# Patient Record
Sex: MALE | Race: WHITE | NOT HISPANIC OR LATINO
[De-identification: names, ages, dates, MRNs, and addresses within clinical notes are randomized per-mention and may not be internally consistent; named-entity substitution may affect disease eponyms.]

---

## 2023-05-22 PROBLEM — Z00.00 ENCOUNTER FOR PREVENTIVE HEALTH EXAMINATION: Status: ACTIVE | Noted: 2023-05-22

## 2023-05-30 ENCOUNTER — NON-APPOINTMENT (OUTPATIENT)
Age: 63
End: 2023-05-30

## 2023-05-30 ENCOUNTER — APPOINTMENT (OUTPATIENT)
Dept: ORTHOPEDIC SURGERY | Facility: CLINIC | Age: 63
End: 2023-05-30
Payer: COMMERCIAL

## 2023-05-30 VITALS
SYSTOLIC BLOOD PRESSURE: 103 MMHG | HEIGHT: 61.5 IN | HEART RATE: 60 BPM | BODY MASS INDEX: 20.3 KG/M2 | DIASTOLIC BLOOD PRESSURE: 70 MMHG | WEIGHT: 108.9 LBS

## 2023-05-30 DIAGNOSIS — M16.11 UNILATERAL PRIMARY OSTEOARTHRITIS, RIGHT HIP: ICD-10-CM

## 2023-05-30 PROCEDURE — 99205 OFFICE O/P NEW HI 60 MIN: CPT

## 2023-05-30 PROCEDURE — 73502 X-RAY EXAM HIP UNI 2-3 VIEWS: CPT | Mod: RT

## 2023-05-30 NOTE — PHYSICAL EXAM
[de-identified] : General Appearance / Station: Well developed, well nourished, in no acute distress\par Orientation: Oriented to person, place, and time\par Gait & Station: Ambulates without assistive device\par Neurologic: Normal leg sensation\par Cardiovascular: Warm extremity\par Lymphatics: No lymphedema\par Generalized Ligament Laxity: Normal\par Stiffness: Normal.\par \par LUMBAR SPINE\par Nontender at lumbar spine\par Straight leg raise: Negative\par Motor: 5/5 motor L2-S1\par Sensation Intact. No paresthesias L2-S1\par \par SYMPTOMATIC RIGHT HIP\par Range of motion: PAINFUL internal and external rotation of the hip.\par Strength: Within Normal Limits. Negative Trendelenburg sign                                                                     \par FADIR: POSITIVE\par Stinchfield: POSITIVE, with groin pain\par Palpation: TENDER at greater trochanter, Nontender SI joint                  \par \par RIGHT KNEE\par Alignment: Varus\par Skin: Normal. \par Effusion: None\par Quadriceps: Normal\par Range of motion: Normal\par PF crepitus: 1+\par PF apprehension: None\par Patella / Patella Tendon: None\par Palpation: Nontender\par Meniscus signs: Negative\par \par ASYMPTOMATIC LEFT HIP\par Range of motion: Painless internal and external rotation of the hip.\par Skin: Normal\par Strength: Within normal limits\par TAVO: Negative\par FADIR: Negative\par Stinchfield: Negative\par Palpation: Nontender at greater trochanter, Nontender at SI joint\par  [de-identified] : Imaging: AP Pelvis and lateral views of the right hip show right hip severe osteoarthritis with loss of joint space, subchondral sclerosis, marginal osteophyte formations, and subchondral cyst. There are no signs of fracture. The soft tissues appear unremarkable\par

## 2023-05-30 NOTE — HISTORY OF PRESENT ILLNESS
[de-identified] : Thomas Woody  is an 62 year-old male presents today with a chief complaint of right hip pain. Patient describes onset over the last number of months, but it may be going on for years.  He originally was scheduled for a hip resurfacing versus total hip back in 2018 however he had no pain at that time.  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  He still rests handball leagues but is having that more more difficulty with that as well.  He previously had been able to use a stationary bike and walk to stay active however he is finding it even has pain with those activities now\par \par Patient points to the hip, specifically in the groin aspect as maximum point of tenderness. Pain is typically 2-4/10 in severity, dull and achy but sometimes sharp in quality with certain activities. Symptoms are exacerbated by activity, or even walking/standing. They are improved with rest and modifications of daily routines. Patient denies any radiation of symptoms beyond the surrounding area. Back and knee symptoms appear to be largely unrelated. \par \par To address the pathology, they have tried OTC medications/NSAIDs with some relief, even though short lasting.  Exercise therapy actually makes things worse but may have allowed greater than 50 % range of motion. Putting on shoes and other similar activities of daily living are difficult. Things have gotten bad enough that patient will need assistive devices like the banister for going up and down stairs. \par \par At this point the patient is quite frustrated by their condition. As duration of symptoms exceeds [], they are here for further evaluation and discussion of possible diagnoses and management. They deny any further trauma. No fever or chills, no signs of infection. Today, patient does not state any other associated signs or complains outside of those described. \par \par A complete review of symptoms as well as past medical/surgical history, medications, allergies, social and family history, and other details of HPI and exam were reviewed per first visit intake form and updated accordingly. Additional and more relevant details are noted in further detail today. \par \par

## 2023-05-30 NOTE — DISCUSSION/SUMMARY
[Surgical risks reviewed] : Surgical risks reviewed [de-identified] : Thomas Woody  is an 62 year-year-old male with bone on bone arthritis of their Right Hip. The patient and I reviewed their chief complaint, history of present illness, radiology findings, differential diagnosis and the pros, cons, alternatives, risks, and benefits of further conservative treatment versus operative intervention. Based upon the patients continued symptoms and failure to respond to conservative treatment and after a shared decision making process, the patient would like to proceed with a Right Total Hip Replacement. 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 I explained that this is a major surgery with significant procedure risk factors. The benefits of surgery were discussed with the patient including the potential for improving his/her current clinical condition through operative intervention. Alternatives to surgical intervention including continued conservative management were also discussed in detail.  \par \par The patient  clearly understand that there is no guarantee of improvement with either treatment option, both carry risks including worsening pain. They also understand the indications and limitations of surgery and the need for post operative rehabilitation / recovery. Healing times vary greatly among patients and this was discussed. All patient questions were answered satisfactorily. Risks, benefits, and alternatives to surgery have been discussed with the patient including but not limited to bleeding and need for blood transfusion, infection, intraoperative or postoperative fracture, hardware failure, neurovascular injury, thigh or knee numbness, chronic pain and scarring, chronic tendonitis or swelling, dislocation, leg-length discrepancy, the potential need for future surgery, DVT, PE, heart attack, stroke and death. The patient was told that we will take steps to minimize these risks by using sterile technique, antibiotics and DVT prophylaxis when appropriate and follow the patient postoperatively in the office setting to monitor progress but we cannot eliminate these risks completely. The possibility of recurrent pain, no improvement in pain and actual worsening of pain were also discussed with the patient.  \par \par The patient demonstrates understanding and wishes to proceed.After our long discussion the final decision for surgery was made today. The patient will sit down with the surgical coordinator to discuss clearances and complete necessary paperwork.\par \par During the shared decision making process, educational tools including implant model and patient x-rays were used to discuss implant type and function.  Ceramic on polyethylene implants from [employer]/ [employer], [employer]/ [employer], or [employer]  are the implants I am most comfortable utilizing in my primary total hip replacements and the implant I am planning for their total hip.  If the patient wishes to utilize a different implant brand or type for their total hip they may obtain an additional surgical opinion from a surgeon utilizing that brand of implant. The hospitalization and post-operative care and rehabilitation were also discussed. The use of perioperative antibiotics and DVT prophylaxis were discussed. The patient was also advised of risks related to the medical comorbidities and elevated body mass index (BMI).  The discharge plan of care focused on the patient going home following surgery and the importance to remove trip and fall hazards about the house prior to admission so that they decrease the risk of fall once discharged as a fall can be catastrophic after total joint replacement. I encouraged the patient to make the necessary arrangements to have someone stay with them when they are discharged home.  Home physical therapy will commence following discharge provided it is appropriate and covered by their health insurance benefit plan.   \par \par All questions were answered to the satisfaction of the patient. The patient agreed to the plan of care as well as the use of implants in their total joint replacement. \par \par The patient was advised that they will require a medical preoperative risk evaluation by their PCP . Further medical subspecialty clearances may be indicated if felt needed by their PCP. \par \par \par

## 2023-07-12 ENCOUNTER — APPOINTMENT (OUTPATIENT)
Dept: ORTHOPEDIC SURGERY | Facility: HOSPITAL | Age: 63
End: 2023-07-12

## 2023-07-28 ENCOUNTER — APPOINTMENT (OUTPATIENT)
Dept: PAIN MANAGEMENT | Facility: CLINIC | Age: 63
End: 2023-07-28

## 2023-08-02 ENCOUNTER — APPOINTMENT (OUTPATIENT)
Dept: OTOLARYNGOLOGY | Facility: CLINIC | Age: 63
End: 2023-08-02

## 2023-09-06 ENCOUNTER — APPOINTMENT (OUTPATIENT)
Dept: PAIN MANAGEMENT | Facility: CLINIC | Age: 63
End: 2023-09-06
Payer: COMMERCIAL

## 2023-09-06 VITALS — BODY MASS INDEX: 21.71 KG/M2 | HEIGHT: 61 IN | WEIGHT: 115 LBS

## 2023-09-06 DIAGNOSIS — M79.18 MYALGIA, OTHER SITE: ICD-10-CM

## 2023-09-06 PROCEDURE — 99204 OFFICE O/P NEW MOD 45 MIN: CPT | Mod: 25

## 2023-09-06 PROCEDURE — 20552 NJX 1/MLT TRIGGER POINT 1/2: CPT

## 2023-09-06 PROCEDURE — J3490M: CUSTOM

## 2023-09-06 NOTE — PROCEDURE
[Trigger point 1-2 muscle groups] : trigger point 1-2 muscle groups [Left] : of the left [Lumbar paraspinal muscle] : lumbar paraspinal muscle [Pain] : pain [Inflammation] : inflammation [Alcohol] : alcohol [Ethyl Chloride sprayed topically] : ethyl chloride sprayed topically [Sterile technique used] : sterile technique used [___ cc    1%] : Lidocaine ~Vcc of 1%  [___ cc    0.25%] : Bupivacaine (Marcaine) ~Vcc of 0.25%  [___ cc    10mg] : Triamcinolone (Kenalog) ~Vcc of 10 mg  [___ cc    30mg] : Ketorolac (Toradol) ~Vcc of 30 mg  [] : Patient tolerated procedure well [Call if redness, pain or fever occur] : call if redness, pain or fever occur [Risks, benefits, alternatives discussed / Verbal consent obtained] : the risks benefits, and alternatives have been discussed, and verbal consent was obtained

## 2023-09-06 NOTE — HISTORY OF PRESENT ILLNESS
[Lower back] : lower back [9] : 9 [0] : 0 [Dull/Aching] : dull/aching [Sharp] : sharp [Constant] : constant [Work] : work [Rest] : rest [Sitting] : sitting [Walking] : walking [Exercising] : exercising [Stairs] : stairs [Full time] : Work status: full time [FreeTextEntry1] : 9/6/23 Pt complains of having pain on lower back for a month, the pain comes and goes but the pain gets worse with activity.  [] : Patient is currently injured and not playing sports: no [de-identified] : X-ray

## 2023-09-06 NOTE — PHYSICAL EXAM

## 2023-09-09 ENCOUNTER — APPOINTMENT (OUTPATIENT)
Dept: MRI IMAGING | Facility: CLINIC | Age: 63
End: 2023-09-09

## 2023-09-30 ENCOUNTER — APPOINTMENT (OUTPATIENT)
Dept: MRI IMAGING | Facility: CLINIC | Age: 63
End: 2023-09-30
Payer: COMMERCIAL

## 2023-09-30 PROCEDURE — 72148 MRI LUMBAR SPINE W/O DYE: CPT

## 2023-11-01 ENCOUNTER — APPOINTMENT (OUTPATIENT)
Dept: PAIN MANAGEMENT | Facility: CLINIC | Age: 63
End: 2023-11-01
Payer: COMMERCIAL

## 2023-11-01 VITALS — HEIGHT: 61 IN | WEIGHT: 115 LBS | BODY MASS INDEX: 21.71 KG/M2

## 2023-11-01 PROCEDURE — 99213 OFFICE O/P EST LOW 20 MIN: CPT

## 2023-12-07 PROBLEM — M54.17 LUMBOSACRAL RADICULITIS: Status: ACTIVE | Noted: 2023-09-06

## 2023-12-08 ENCOUNTER — APPOINTMENT (OUTPATIENT)
Dept: PAIN MANAGEMENT | Facility: CLINIC | Age: 63
End: 2023-12-08
Payer: COMMERCIAL

## 2023-12-08 DIAGNOSIS — M54.17 RADICULOPATHY, LUMBOSACRAL REGION: ICD-10-CM

## 2023-12-08 PROCEDURE — 64483 NJX AA&/STRD TFRM EPI L/S 1: CPT | Mod: LT

## 2023-12-08 PROCEDURE — 64484 NJX AA&/STRD TFRM EPI L/S EA: CPT | Mod: 59,LT

## 2023-12-18 ENCOUNTER — APPOINTMENT (OUTPATIENT)
Dept: PODIATRY | Facility: CLINIC | Age: 63
End: 2023-12-18

## 2023-12-29 ENCOUNTER — APPOINTMENT (OUTPATIENT)
Dept: PAIN MANAGEMENT | Facility: CLINIC | Age: 63
End: 2023-12-29

## 2025-07-29 ENCOUNTER — NON-APPOINTMENT (OUTPATIENT)
Age: 65
End: 2025-07-29

## 2025-07-29 ENCOUNTER — APPOINTMENT (OUTPATIENT)
Age: 65
End: 2025-07-29
Payer: COMMERCIAL

## 2025-07-29 PROCEDURE — 92134 CPTRZ OPH DX IMG PST SGM RTA: CPT

## 2025-07-29 PROCEDURE — 92020 GONIOSCOPY: CPT

## 2025-07-29 PROCEDURE — 92202 OPSCPY EXTND ON/MAC DRAW: CPT

## 2025-07-29 PROCEDURE — 92014 COMPRE OPH EXAM EST PT 1/>: CPT

## 2025-08-19 ENCOUNTER — APPOINTMENT (OUTPATIENT)
Age: 65
End: 2025-08-19